# Patient Record
Sex: MALE
[De-identification: names, ages, dates, MRNs, and addresses within clinical notes are randomized per-mention and may not be internally consistent; named-entity substitution may affect disease eponyms.]

---

## 2022-11-01 PROBLEM — Z00.00 ENCOUNTER FOR PREVENTIVE HEALTH EXAMINATION: Status: ACTIVE | Noted: 2022-11-01

## 2022-11-04 ENCOUNTER — APPOINTMENT (OUTPATIENT)
Dept: PULMONOLOGY | Facility: CLINIC | Age: 27
End: 2022-11-04

## 2022-11-04 VITALS
SYSTOLIC BLOOD PRESSURE: 120 MMHG | WEIGHT: 150 LBS | HEART RATE: 102 BPM | DIASTOLIC BLOOD PRESSURE: 74 MMHG | TEMPERATURE: 97.1 F | HEIGHT: 69 IN | BODY MASS INDEX: 22.22 KG/M2 | OXYGEN SATURATION: 97 %

## 2022-11-04 DIAGNOSIS — D75.1 SECONDARY POLYCYTHEMIA: ICD-10-CM

## 2022-11-04 PROCEDURE — 94060 EVALUATION OF WHEEZING: CPT

## 2022-11-04 PROCEDURE — 99204 OFFICE O/P NEW MOD 45 MIN: CPT | Mod: 25

## 2022-11-04 PROCEDURE — 94727 GAS DIL/WSHOT DETER LNG VOL: CPT

## 2022-11-04 PROCEDURE — 94729 DIFFUSING CAPACITY: CPT

## 2022-11-05 RX ORDER — OMEPRAZOLE 40 MG/1
40 CAPSULE, DELAYED RELEASE ORAL
Qty: 30 | Refills: 0 | Status: COMPLETED | COMMUNITY
Start: 2022-06-29

## 2022-11-05 NOTE — PROCEDURE
[FreeTextEntry1] : PFT 11/4/22: Spirometry was normal. Lung volumes were normal. Diffusion capacity was normal. No significant improvement after bronchodilator.

## 2022-11-05 NOTE — REVIEW OF SYSTEMS
[History of Iron Deficiency] : history of iron deficiency [Fever] : no fever [Chills] : no chills [Postnasal Drip] : no postnasal drip [Sinus Problems] : no sinus problems [Cough] : no cough [Chest Discomfort] : no chest discomfort [Edema] : no edema [Palpitations] : no palpitations [Nasal Discharge] : no nasal discharge [GERD] : no gerd [Myalgias] : no myalgias [Arthralgias] : no arthralgias [Chronic Pain] : no chronic pain [Rash] : no rash [Anemia] : no anemia [Headache] : no headache [Anxiety] : no anxiety [Diabetes] : no diabetes [Thyroid Problem] : no thyroid problem

## 2022-11-05 NOTE — HISTORY OF PRESENT ILLNESS
Pt arrives from home via POV with mask in place. Pt had difficulty getting out of bed this morning. C/O SOB x 1 1/2 weeks - HX lung CA L Lung - last Chemo this January. C/O chest pain x 3 weeks with radiation to back. Denies n/v/d. Has not been eating or drinking well. Was Hypoxic in triage at 80, placed on 6L. [Never] : never [Snoring] : snoring [TextBox_4] : He is a 27 year-old man with a long history of erythrocytosis.  He was referred for possible sleep disordered breathing. \par \par He has been noted to snore but denies any daytime somnolence. He has no history of sinus disease.  He has never been tested for sleep disordered breathing.\par \par He denied any history of pulmonary disease.  He never smoked.  Her occupational history is unremarkable. He is physically active.  [Awakes Unrefreshed] : does not awaken unrefreshed [Daytime Somnolence] : denies daytime somnolence [Difficulty Maintaining Sleep] : does not have difficulty maintaining sleep [Hypersomnolence] : denies hypersomnolence [Nonrestorative Sleep] : denies nonrestorative sleep [Tired while Driving] : not tired while driving

## 2022-11-05 NOTE — DISCUSSION/SUMMARY
[FreeTextEntry1] : He is a 27 year-old man with a long history of erythrocytosis.  He was referred for possible sleep disordered breathing. \par \par He has been noted to snore but denies any daytime somnolence. He has no history of sinus disease.  He has never been tested for sleep disordered breathing.\par \par He denied any history of pulmonary disease.  He never smoked.  Her occupational history is unremarkable. He is physically active. \par \par His physical examination was unremarkable.  Pulmonary function testing was normal.  There is no evidence of any active pulmonary disease at this time.  A home sleep apnea test will be obtained.\par \par Further recommendations to follow the results of the above.

## 2022-11-05 NOTE — PHYSICAL EXAM
[No Acute Distress] : no acute distress [Well Groomed] : well groomed [Well Developed] : well developed [Normal Oropharynx] : normal oropharynx [Normal Appearance] : normal appearance [No Neck Mass] : no neck mass [Normal Rate/Rhythm] : normal rate/rhythm [Normal S1, S2] : normal s1, s2 [No Resp Distress] : no resp distress [Clear to Auscultation Bilaterally] : clear to auscultation bilaterally [No Abnormalities] : no abnormalities [No HSM] : no hsm [No Clubbing] : no clubbing [No Cyanosis] : no cyanosis [No Edema] : no edema [Normal Color/ Pigmentation] : normal color/ pigmentation [Normal Turgor] : normal turgor [No Focal Deficits] : no focal deficits [Oriented x3] : oriented x3 [Normal Affect] : normal affect [II] : Mallampati Class: II

## 2022-12-22 ENCOUNTER — APPOINTMENT (OUTPATIENT)
Dept: PULMONOLOGY | Facility: CLINIC | Age: 27
End: 2022-12-22

## 2022-12-22 VITALS
BODY MASS INDEX: 22.45 KG/M2 | WEIGHT: 152 LBS | OXYGEN SATURATION: 96 % | DIASTOLIC BLOOD PRESSURE: 80 MMHG | SYSTOLIC BLOOD PRESSURE: 120 MMHG | TEMPERATURE: 97.5 F | HEART RATE: 105 BPM

## 2022-12-22 DIAGNOSIS — R06.83 SNORING: ICD-10-CM

## 2022-12-22 PROCEDURE — 99214 OFFICE O/P EST MOD 30 MIN: CPT

## 2022-12-22 NOTE — DISCUSSION/SUMMARY
[FreeTextEntry1] : He is a 27 year-old man with a long history of erythrocytosis.  He was referred for possible sleep disordered breathing. He has been noted to snore but denies any daytime somnolence. He has no history of sinus disease.  He has never been tested for sleep disordered breathing.He denied any history of pulmonary disease.  He never smoked.  His occupational history is unremarkable. He is physically active. \par \par His sleep study from 12/8/2022 demonstrated mild obstructive sleep apnea with no significant nocturnal oxygen desaturation.  The lowest measured saturation was 92% and it lasted for 23 seconds.  It is doubtful that his polycythemia is a consequence of this.\par \par For his mild WANDA he was advised to see ENT given that his BMI is normal.  Upper airway pathology needs to be considered.  He may need a mandibular advancement device if he wants to consider therapy although therapy is optional given that he is asymptomatic except for the snoring. \par \par Further recommendations to follow the results of the above.\par \par CC: Dr. Lolis Weiss

## 2022-12-22 NOTE — REVIEW OF SYSTEMS
[History of Iron Deficiency] : history of iron deficiency [Fatigue] : no fatigue [Nasal Congestion] : no nasal congestion [Postnasal Drip] : no postnasal drip [Sinus Problems] : no sinus problems [Cough] : no cough [Sputum] : no sputum [Chest Discomfort] : no chest discomfort [Edema] : no edema [Palpitations] : no palpitations [Nasal Discharge] : no nasal discharge [GERD] : no gerd [Arthralgias] : no arthralgias [Myalgias] : no myalgias [Chronic Pain] : no chronic pain [Rash] : no rash [Anemia] : no anemia [Headache] : no headache [Anxiety] : no anxiety [Diabetes] : no diabetes [Thyroid Problem] : no thyroid problem

## 2022-12-22 NOTE — HISTORY OF PRESENT ILLNESS
[Snoring] : snoring [Never] : never [TextBox_4] : He is a 27 year-old man with a long history of erythrocytosis.  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 He was referred for possible sleep disordered breathing.He denied any history of pulmonary disease.  He never smoked.  Her occupational history is unremarkable. He is physically active. \par \par Sleep study was performed and he came to discuss the results. [Awakes Unrefreshed] : does not awaken unrefreshed [Awakes with Headache] : does not awaken with headache [Daytime Somnolence] : denies daytime somnolence [Difficulty Maintaining Sleep] : does not have difficulty maintaining sleep [Fatigue] : no fatigue [Nonrestorative Sleep] : denies nonrestorative sleep [Tired while Driving] : not tired while driving

## 2022-12-22 NOTE — PROCEDURE
[FreeTextEntry1] : PFT 11/4/22: Spirometry was normal. Lung volumes were normal. Diffusion capacity was normal. No significant improvement after bronchodilator. \par \par Chest x-ray 11/4/22: No acute pathology. \par \par PSG 12/8/22: Mild WANDA. AHI of 5.5. Most marked in supine and right lateral position. Lowest saturation level was 92% for 23 seconds. Snoring was noted.

## 2022-12-22 NOTE — PHYSICAL EXAM
[No Acute Distress] : no acute distress [Well Groomed] : well groomed [Normal Appearance] : normal appearance [No Neck Mass] : no neck mass [Normal Rate/Rhythm] : normal rate/rhythm [Normal S1, S2] : normal s1, s2 [No Resp Distress] : no resp distress [Clear to Auscultation Bilaterally] : clear to auscultation bilaterally [No Abnormalities] : no abnormalities [No HSM] : no hsm [No Clubbing] : no clubbing [No Cyanosis] : no cyanosis [No Edema] : no edema [Normal Color/ Pigmentation] : normal color/ pigmentation [Normal Turgor] : normal turgor [No Focal Deficits] : no focal deficits [Oriented x3] : oriented x3 [Normal Affect] : normal affect [III] : Mallampati Class: III

## 2023-02-02 ENCOUNTER — APPOINTMENT (OUTPATIENT)
Dept: PULMONOLOGY | Facility: CLINIC | Age: 28
End: 2023-02-02
Payer: MEDICAID

## 2023-02-02 VITALS
SYSTOLIC BLOOD PRESSURE: 110 MMHG | TEMPERATURE: 97.7 F | WEIGHT: 150 LBS | DIASTOLIC BLOOD PRESSURE: 80 MMHG | HEART RATE: 111 BPM | BODY MASS INDEX: 22.15 KG/M2 | OXYGEN SATURATION: 97 %

## 2023-02-02 DIAGNOSIS — G47.33 OBSTRUCTIVE SLEEP APNEA (ADULT) (PEDIATRIC): ICD-10-CM

## 2023-02-02 PROCEDURE — 99214 OFFICE O/P EST MOD 30 MIN: CPT

## 2023-02-02 NOTE — REVIEW OF SYSTEMS
[History of Iron Deficiency] : history of iron deficiency [Fever] : no fever [Fatigue] : no fatigue [Nasal Congestion] : no nasal congestion [Postnasal Drip] : no postnasal drip [Sinus Problems] : no sinus problems [Cough] : no cough [Sputum] : no sputum [Edema] : no edema [Palpitations] : no palpitations [Nasal Discharge] : no nasal discharge [GERD] : no gerd [Arthralgias] : no arthralgias [Myalgias] : no myalgias [Chronic Pain] : no chronic pain [Rash] : no rash [Anemia] : no anemia [Headache] : no headache [Anxiety] : no anxiety [Diabetes] : no diabetes [Thyroid Problem] : no thyroid problem

## 2023-02-02 NOTE — DISCUSSION/SUMMARY
[FreeTextEntry1] : He is a 27 year-old man with a long history of erythrocytosis.  He was referred for possible sleep disordered breathing. He has been noted to snore but denies any daytime somnolence. He has no history of sinus disease.  He has never been tested for sleep disordered breathing.He denied any history of pulmonary disease.  He never smoked.  His occupational history is unremarkable. He is physically active. \par \par His sleep study from 12/8/2022 demonstrated mild obstructive sleep apnea.\par \par He has mild obstructive sleep apnea and appears to be asymptomatic apart from the erythrocytosis which theoretically could be a consequence of the WANDA. The various therapeutic options were discussed with him.  CPAP was reviewed and a CPAP mask was demonstrated to him.  Another option would be mandibular advancement.  He would have to see a dentist specialized in this.  He would like to consider these options and let me know which way he would like to proceed.\par \par A therapeutic trial of either CPAP or mandibular advancement is worth considering. I will arrange for CPAP if he decides to go this way or refer him to a dentist specialized in mandibular advancement for sleep disordered breathing.

## 2023-02-02 NOTE — HISTORY OF PRESENT ILLNESS
[Never] : never [Snoring] : snoring [TextBox_4] : He is a 27 year-old man with a long history of erythrocytosis.  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 He was referred for possible sleep disordered breathing.He denied any history of pulmonary disease.  He never smoked.  Her occupational history is unremarkable. He is physically active. \par \par He has been getting phlebotomies to control his elevated red blood cell count.\par \par He was seen by ENT and was told that his upper airway was okay. [Awakes Unrefreshed] : does not awaken unrefreshed [Awakes with Headache] : does not awaken with headache [Daytime Somnolence] : denies daytime somnolence [Difficulty Maintaining Sleep] : does not have difficulty maintaining sleep [Fatigue] : no fatigue [Nonrestorative Sleep] : denies nonrestorative sleep [Tired while Driving] : not tired while driving

## 2023-02-02 NOTE — PHYSICAL EXAM
[No Acute Distress] : no acute distress [Well Groomed] : well groomed [III] : Mallampati Class: III [Normal Appearance] : normal appearance [No Neck Mass] : no neck mass [Normal Rate/Rhythm] : normal rate/rhythm [Normal S1, S2] : normal s1, s2 [No Resp Distress] : no resp distress [Clear to Auscultation Bilaterally] : clear to auscultation bilaterally [No Abnormalities] : no abnormalities [No HSM] : no hsm [No Clubbing] : no clubbing [No Cyanosis] : no cyanosis [No Edema] : no edema [Normal Color/ Pigmentation] : normal color/ pigmentation [Normal Turgor] : normal turgor [No Focal Deficits] : no focal deficits [Oriented x3] : oriented x3 [Normal Affect] : normal affect [Normal Oropharynx] : normal oropharynx [TextBox_11] : No evident polyps.  No obvious deviated septum.

## 2023-02-27 ENCOUNTER — NON-APPOINTMENT (OUTPATIENT)
Age: 28
End: 2023-02-27